# Patient Record
Sex: FEMALE | Race: WHITE | NOT HISPANIC OR LATINO | Employment: FULL TIME | URBAN - METROPOLITAN AREA
[De-identification: names, ages, dates, MRNs, and addresses within clinical notes are randomized per-mention and may not be internally consistent; named-entity substitution may affect disease eponyms.]

---

## 2021-07-14 ENCOUNTER — OFFICE VISIT (OUTPATIENT)
Dept: OTOLARYNGOLOGY | Facility: CLINIC | Age: 41
End: 2021-07-14

## 2021-07-14 VITALS — HEIGHT: 66 IN | WEIGHT: 246.4 LBS | TEMPERATURE: 97.8 F | BODY MASS INDEX: 39.6 KG/M2

## 2021-07-14 DIAGNOSIS — E04.9 ENLARGED THYROID GLAND: Primary | ICD-10-CM

## 2021-07-14 DIAGNOSIS — E06.9 THYROIDITIS: ICD-10-CM

## 2021-07-14 PROCEDURE — 99203 OFFICE O/P NEW LOW 30 MIN: CPT | Performed by: NURSE PRACTITIONER

## 2021-07-14 RX ORDER — IBUPROFEN 800 MG/1
800 TABLET ORAL 3 TIMES DAILY PRN
COMMUNITY
Start: 2021-04-27 | End: 2022-03-30

## 2021-07-14 RX ORDER — LEVOTHYROXINE SODIUM 88 UG/1
88 TABLET ORAL DAILY
COMMUNITY
Start: 2021-06-23 | End: 2022-03-29 | Stop reason: HOSPADM

## 2021-07-14 RX ORDER — METHOCARBAMOL 500 MG/1
TABLET, FILM COATED ORAL
COMMUNITY
Start: 2021-05-03 | End: 2022-04-04

## 2021-07-14 NOTE — PROGRESS NOTES
Patient Name: Arlen Hernandes   Visit Date: 07/14/2021   Patient ID: [id number]  Provider: JULIETA Mercedes    Sex: female  [place] [facility]   YOB: 1980  Location Address: [address] [place] [address], [address] [place] [address],?[place]?[address]    Primary Care Provider Allie Guzman APRN  Location Phone: [phone]    Referring Provider: Lucas Holley MD        Chief Complaint  Thyroid Problem    Subjective   Arlen Hernandes is a 40 y.o. female who presents to [facility] today as a consult from Lucas Holley MD for evaluation of thyroid enlargement and thyroiditis.  Patient reports for the past several years she has been dealing with hypothyroidism.  She states that over the past year she has had an increase in symptoms including fatigue, weight gain, hair loss.  Labs drawn from 5/4/2021 show her TSH elevated at 22.95.  She states at that time she was increased to 88 mcg of levothyroxine.  Repeat TSH on 6/22/2021 showed that her TSH level was still elevated but had improved to 9.48.  She states she is due to have her labs redrawn in September.  patient states that over the past few months she noticed an enlargement on the right side of her neck.  She states that it would sometimes feel tight when she lays down at night. She had an ultrasound of her thyroid performed on 5/6/2021 that showed the right lobe at 2.7 x 3.2 x 5.3 cm in the left lobe at 1.4 x 1.5 x 4.1 cm.  The isthmus thickness was 8 mm.  Thyroid echogenicity is diffusely decreased and echotexture is diffusely coarsened.  Somewhat lacelike appearance of the thyroid parenchyma bilaterally, nonspecific but could reflect sequela of Hashimoto's thyroiditis.  There is asymmetric enlargement of the right thyroid lobe compared to the left but no cystic or solid thyroid mass on either side.  Patient denies any dysphagia symptoms.      Current Outpatient Medications on File Prior to Visit  "  Medication Sig   • Cyanocobalamin (Vitamin B12) 1000 MCG tablet controlled-release Take 1 tablet by mouth Daily.   • ibuprofen (ADVIL,MOTRIN) 800 MG tablet Take 800 mg by mouth 3 (Three) Times a Day As Needed. for pain   • levothyroxine (SYNTHROID, LEVOTHROID) 88 MCG tablet Take 88 mcg by mouth Daily.   • methocarbamol (ROBAXIN) 500 MG tablet TAKE 1 TABLET BY MOUTH EVERY 12 HOURS AS NEEDED FOR MUSCLE SPASM OR PAIN   • vitamin D3 125 MCG (5000 UT) capsule capsule Take 1 capsule by mouth Daily.     No current facility-administered medications on file prior to visit.        Social History     Tobacco Use   • Smoking status: Current Every Day Smoker     Packs/day: 1.00     Types: Cigarettes   • Smokeless tobacco: Never Used   • Tobacco comment: Smoked 11-20 years    Vaping Use   • Vaping Use: Never used   Substance Use Topics   • Alcohol use: Yes     Comment: Rarely    • Drug use: Not on file       Objective     Vital Signs:   Temp 97.8 °F (36.6 °C) (Temporal)   Ht 167.6 cm (66\")   Wt 112 kg (246 lb 6.4 oz)   BMI 39.77 kg/m²       Physical Exam  Constitutional:       General: She is not in acute distress.     Appearance: Normal appearance. She is not ill-appearing.   HENT:      Head: Normocephalic and atraumatic.      Jaw: There is normal jaw occlusion. No tenderness or pain on movement.      Salivary Glands: Right salivary gland is not diffusely enlarged or tender. Left salivary gland is not diffusely enlarged or tender.      Right Ear: Tympanic membrane, ear canal and external ear normal.      Left Ear: Tympanic membrane, ear canal and external ear normal.      Nose: Nose normal. No septal deviation.      Right Sinus: No maxillary sinus tenderness or frontal sinus tenderness.      Left Sinus: No maxillary sinus tenderness or frontal sinus tenderness.      Mouth/Throat:      Lips: Pink. No lesions.      Mouth: Mucous membranes are moist. No oral lesions.      Dentition: Normal dentition.      Tongue: No lesions. "      Palate: No mass and lesions.      Pharynx: Oropharynx is clear. Uvula midline.      Tonsils: No tonsillar exudate.   Eyes:      Extraocular Movements: Extraocular movements intact.      Conjunctiva/sclera: Conjunctivae normal.      Pupils: Pupils are equal, round, and reactive to light.   Neck:      Thyroid: Thyromegaly present. No thyroid mass or thyroid tenderness.      Trachea: Trachea normal.      Comments: Right-sided thyroid enlargement noted.  No mass palpated.  Pulmonary:      Effort: Pulmonary effort is normal. No respiratory distress.   Musculoskeletal:         General: Normal range of motion.      Cervical back: Normal range of motion and neck supple. No tenderness.   Lymphadenopathy:      Cervical: No cervical adenopathy.   Skin:     General: Skin is warm and dry.   Neurological:      General: No focal deficit present.      Mental Status: She is alert and oriented to person, place, and time.      Cranial Nerves: No cranial nerve deficit.      Motor: No weakness.      Gait: Gait normal.   Psychiatric:         Mood and Affect: Mood normal.         Behavior: Behavior normal.         Thought Content: Thought content normal.         Judgment: Judgment normal.               Result Review :         Thyroid ultrasound from 5/6/2021 reviewed and findings are stated in HPI.     Assessment and Plan    Diagnoses and all orders for this visit:    1. Enlarged thyroid gland (Primary)  -     US Thyroid; Future    2. Thyroiditis  -     US Thyroid; Future    I will order repeat thyroid ultrasound in 6 months and plan to see her back after this.  I encouraged her to continue to take her levothyroxine first thing in the morning on an empty stomach.  She will have her labs rechecked in 3 months and I will get copies of these for review.       Follow Up   Return in about 6 months (around 1/14/2022), or after thyroid ultrasound.  Patient was given instructions and counseling regarding her condition or for health  maintenance advice. Please see specific information pulled into the AVS if appropriate.      JULIETA Mercedes

## 2022-01-03 ENCOUNTER — HOSPITAL ENCOUNTER (OUTPATIENT)
Dept: ULTRASOUND IMAGING | Facility: HOSPITAL | Age: 42
Discharge: HOME | End: 2022-01-03
Admitting: NURSE PRACTITIONER

## 2022-01-03 DIAGNOSIS — E06.9 THYROIDITIS: ICD-10-CM

## 2022-01-03 DIAGNOSIS — E04.9 ENLARGED THYROID GLAND: ICD-10-CM

## 2022-01-03 PROCEDURE — 76536 US EXAM OF HEAD AND NECK: CPT

## 2022-01-14 ENCOUNTER — OFFICE VISIT (OUTPATIENT)
Dept: OTOLARYNGOLOGY | Facility: CLINIC | Age: 42
End: 2022-01-14

## 2022-01-14 VITALS — HEIGHT: 66 IN | BODY MASS INDEX: 39.66 KG/M2 | TEMPERATURE: 97.2 F | WEIGHT: 246.8 LBS

## 2022-01-14 DIAGNOSIS — E03.9 HYPOTHYROIDISM, UNSPECIFIED TYPE: ICD-10-CM

## 2022-01-14 DIAGNOSIS — E04.9 ENLARGED THYROID GLAND: Primary | ICD-10-CM

## 2022-01-14 PROCEDURE — 99213 OFFICE O/P EST LOW 20 MIN: CPT | Performed by: NURSE PRACTITIONER

## 2022-01-14 RX ORDER — PRAVASTATIN SODIUM 40 MG
40 TABLET ORAL
COMMUNITY
Start: 2021-12-21

## 2022-01-14 RX ORDER — LEVOTHYROXINE SODIUM 0.1 MG/1
100 TABLET ORAL
COMMUNITY
Start: 2021-12-21 | End: 2022-05-20

## 2022-01-14 RX ORDER — BROMPHENIRAMINE MALEATE, PSEUDOEPHEDRINE HYDROCHLORIDE, AND DEXTROMETHORPHAN HYDROBROMIDE 2; 30; 10 MG/5ML; MG/5ML; MG/5ML
SYRUP ORAL
COMMUNITY
Start: 2021-12-20 | End: 2022-03-30

## 2022-01-14 NOTE — PROGRESS NOTES
Patient Name: Arlen Hernandes   Visit Date: 01/14/2022   Patient ID: [id number]  Provider: JULIETA Mercedes    Sex: female  Location: [facility]   YOB: 1980  Location Address: [address] [place] [address], [address],?[place]?[address]    Primary Care Provider Allie Guzman APRN  Location Phone: [phone]    Referring Provider: No ref. provider found        Chief Complaint  Results (6 month US RESULTS)    Subjective          Arlen Hernandes is a 41 y.o. female who presents to [facility] for a follow-up visit of right-sided enlarged thyroid and hypothyroidism. When she was last seen 6 months ago she was in the process of medication dose adjustment with her TSH being elevated at 22.95. She states that she recently had her levothyroxine increased to 100 mcg daily and her last labs from 12/20/2021 showed the TSH is normal at 2.72. She states that she does feel like she has some what more energy but still battles fatigue from time to time. She had a repeat thyroid ultrasound performed on 1/3/2022 that was compared to a previous ultrasound from twenty sixteen. This noted an asymmetric enlargement of the right thyroid with a possible dominant mass measuring up to 4 cm. This was previously measured in twenty sixteen and appears similar and also biopsied from 3/21/2016 that showed Hurthle cells. It is not significantly changed with no other new obvious nodules or change. The patient reports today that she would like to discuss having the right side of her thyroid removed. She states that she feels like it is enlarging on the right side and she has a difficult time wearing certain shirts and also a hard time swallowing. She states that she feels like her thyroid places pressure on her neck and she will have pain that radiates from her neck up into the back of her head. She states she has dysphagia symptoms intermittently.      Current Outpatient  "Medications on File Prior to Visit   Medication Sig   • Cyanocobalamin (Vitamin B12) 1000 MCG tablet controlled-release Take 1 tablet by mouth Daily.   • ibuprofen (ADVIL,MOTRIN) 800 MG tablet Take 800 mg by mouth 3 (Three) Times a Day As Needed. for pain   • levothyroxine (SYNTHROID, LEVOTHROID) 100 MCG tablet Take 100 mcg by mouth Daily.   • pravastatin (PRAVACHOL) 40 MG tablet Take 40 mg by mouth every night at bedtime.   • vitamin D3 125 MCG (5000 UT) capsule capsule Take 1 capsule by mouth Daily.   • brompheniramine-pseudoephedrine-DM 30-2-10 MG/5ML syrup TAKE 10 ML BY MOUTH EVERY 4 HOURS FOR 3 DAYS AS NEEDED   • levothyroxine (SYNTHROID, LEVOTHROID) 88 MCG tablet Take 88 mcg by mouth Daily.   • methocarbamol (ROBAXIN) 500 MG tablet TAKE 1 TABLET BY MOUTH EVERY 12 HOURS AS NEEDED FOR MUSCLE SPASM OR PAIN     No current facility-administered medications on file prior to visit.        Social History     Tobacco Use   • Smoking status: Current Every Day Smoker     Packs/day: 0.50     Types: Cigarettes   • Smokeless tobacco: Never Used   • Tobacco comment: Smoked 11-20 years    Vaping Use   • Vaping Use: Never used   Substance Use Topics   • Alcohol use: Yes     Comment: Rarely    • Drug use: Not on file        Objective     Vital Signs:   Temp 97.2 °F (36.2 °C) (Temporal)   Ht 167.6 cm (66\")   Wt 112 kg (246 lb 12.8 oz)   BMI 39.83 kg/m²       Physical Exam  Constitutional:       General: She is not in acute distress.     Appearance: Normal appearance. She is not ill-appearing.   HENT:      Head: Normocephalic and atraumatic.      Jaw: There is normal jaw occlusion. No tenderness or pain on movement.      Salivary Glands: Right salivary gland is not diffusely enlarged or tender. Left salivary gland is not diffusely enlarged or tender.      Right Ear: Tympanic membrane, ear canal and external ear normal.      Left Ear: Tympanic membrane, ear canal and external ear normal.      Nose: Nose normal. No septal " deviation.      Right Sinus: No maxillary sinus tenderness or frontal sinus tenderness.      Left Sinus: No maxillary sinus tenderness or frontal sinus tenderness.      Mouth/Throat:      Lips: Pink. No lesions.      Mouth: Mucous membranes are moist. No oral lesions.      Dentition: Normal dentition.      Tongue: No lesions.      Palate: No mass and lesions.      Pharynx: Oropharynx is clear. Uvula midline.      Tonsils: No tonsillar exudate.   Eyes:      Extraocular Movements: Extraocular movements intact.      Conjunctiva/sclera: Conjunctivae normal.      Pupils: Pupils are equal, round, and reactive to light.   Neck:      Thyroid: Thyromegaly present. No thyroid mass or thyroid tenderness.      Trachea: Trachea normal.      Comments: Right-sided thyroid enlargement with no obvious nodule or mass  Pulmonary:      Effort: Pulmonary effort is normal. No respiratory distress.   Musculoskeletal:         General: Normal range of motion.      Cervical back: Normal range of motion and neck supple. No tenderness.   Lymphadenopathy:      Cervical: No cervical adenopathy.   Skin:     General: Skin is warm and dry.   Neurological:      General: No focal deficit present.      Mental Status: She is alert and oriented to person, place, and time.      Cranial Nerves: No cranial nerve deficit.      Motor: No weakness.      Gait: Gait normal.   Psychiatric:         Mood and Affect: Mood normal.         Behavior: Behavior normal.         Thought Content: Thought content normal.         Judgment: Judgment normal.                Result Review :          US Thyroid (01/03/2022 13:11)  See HPI     Assessment and Plan    Diagnoses and all orders for this visit:    1. Enlarged thyroid gland (Primary)    2. Hypothyroidism, unspecified type    Patient has expressed interest in consultation about surgical removal of her right thyroid lobe. I have discussed this with the patient with the results of her ultrasound and lab work and she has  expressed a desire to meet with a surgeon to discuss this further. I will get her set up with Dr. Mccord for surgical consultation.       Follow Up   Return Schedule with Dr. Mccord for surgical consult.  Patient was given instructions and counseling regarding her condition or for health maintenance advice. Please see specific information pulled into the AVS if appropriate.     JULIETA Mercedes

## 2022-02-16 ENCOUNTER — OFFICE VISIT (OUTPATIENT)
Dept: OTOLARYNGOLOGY | Facility: CLINIC | Age: 42
End: 2022-02-16

## 2022-02-16 ENCOUNTER — PREP FOR SURGERY (OUTPATIENT)
Dept: OTHER | Facility: HOSPITAL | Age: 42
End: 2022-02-16

## 2022-02-16 VITALS — HEIGHT: 66 IN | TEMPERATURE: 97.8 F | WEIGHT: 240.8 LBS | BODY MASS INDEX: 38.7 KG/M2

## 2022-02-16 DIAGNOSIS — E04.9 ENLARGED THYROID GLAND: Primary | ICD-10-CM

## 2022-02-16 DIAGNOSIS — E06.3 HYPOTHYROIDISM DUE TO HASHIMOTO'S THYROIDITIS: ICD-10-CM

## 2022-02-16 DIAGNOSIS — E04.1 THYROID NODULE: ICD-10-CM

## 2022-02-16 DIAGNOSIS — E03.8 HYPOTHYROIDISM DUE TO HASHIMOTO'S THYROIDITIS: ICD-10-CM

## 2022-02-16 PROCEDURE — 99214 OFFICE O/P EST MOD 30 MIN: CPT | Performed by: OTOLARYNGOLOGY

## 2022-02-16 NOTE — PROGRESS NOTES
Patient Name: Arlen Hernandes   Visit Date: 02/16/2022   Patient ID: [id number]  Provider: Indra Mccord MD    Sex: female  [place] [facility]   YOB: 1980  Location Address: [address] [place] [address], [address] [place] [address],?[place]?[address]    Primary Care Provider Allie Guzman APRN  Location Phone: [phone]    Referring Provider: No ref. provider found        Chief Complaint  Discuss possible surgery    History of Present Illness  Arlen Hernandes is a 41 y.o. female who is seen for follow-up of hypothyroidism and new enlarged right thyroid lobe with possible nodule.  She has been seen by JULIETA Majano on 7/14/2021 and 1/14/2022.  Please see those notes for further details.  Thyroid ultrasound on 1/3/2022 revealed a possible 4 cm right thyroid nodule which previously measured 4.5 cm on 3/21/2016.  An FNA at the time revealed an atypical lymphoid population with occasional Hurthle cells likely consistent with Hashimoto's thyroiditis.  She tells me that she has been taking levothyroxine over the past 6 years and has steadily had to increase her dose.  She is unsure of any recent thyroid function testing.  She tells me that she often feels discomfort in the area of her right thyroid lobe.  She also mentions significant fatigue, dry skin, hair loss, headache, and inability to lose weight.  She denies any family history of thyroid cancer or personal history of radiation exposure.      Past Medical History:   Diagnosis Date   • Thyroid disorder        Past Surgical History:   Procedure Laterality Date   • HYSTERECTOMY  2018   • LYMPHADENECTOMY  2012         Current Outpatient Medications:   •  Cyanocobalamin (Vitamin B12) 1000 MCG tablet controlled-release, Take 1 tablet by mouth Daily., Disp: , Rfl:   •  ibuprofen (ADVIL,MOTRIN) 800 MG tablet, Take 800 mg by mouth 3 (Three) Times a Day As Needed. for pain, Disp: , Rfl:   •  levothyroxine (SYNTHROID, LEVOTHROID) 100 MCG tablet,  "Take 100 mcg by mouth Daily., Disp: , Rfl:   •  pravastatin (PRAVACHOL) 40 MG tablet, Take 40 mg by mouth every night at bedtime., Disp: , Rfl:   •  vitamin D3 125 MCG (5000 UT) capsule capsule, Take 1 capsule by mouth Daily., Disp: , Rfl:   •  brompheniramine-pseudoephedrine-DM 30-2-10 MG/5ML syrup, TAKE 10 ML BY MOUTH EVERY 4 HOURS FOR 3 DAYS AS NEEDED, Disp: , Rfl:   •  levothyroxine (SYNTHROID, LEVOTHROID) 88 MCG tablet, Take 88 mcg by mouth Daily., Disp: , Rfl:   •  methocarbamol (ROBAXIN) 500 MG tablet, TAKE 1 TABLET BY MOUTH EVERY 12 HOURS AS NEEDED FOR MUSCLE SPASM OR PAIN, Disp: , Rfl:      No Known Allergies    Social History     Tobacco Use   • Smoking status: Current Every Day Smoker     Packs/day: 0.50     Types: Cigarettes   • Smokeless tobacco: Never Used   • Tobacco comment: Smoked 11-20 years    Vaping Use   • Vaping Use: Never used   Substance Use Topics   • Alcohol use: Yes     Comment: Rarely    • Drug use: Not on file        Objective     Vital Signs:   Temp 97.8 °F (36.6 °C) (Temporal)   Ht 167.6 cm (66\")   Wt 109 kg (240 lb 12.8 oz)   BMI 38.87 kg/m²       Physical Exam    General: Well developed, well nourished patient of stated age in no acute distress. Voice is strong and clear.   Head: Normocephalic and atraumatic.  Face: No lesions.  Bilateral parotid and submandibular glands are unremarkable.  Stensen's and Warthin's ducts are productive of clear saliva bilaterally.  House-Brackmann I/VI     bilaterally.   muscles and temporomandibular joint nontender to palpation.  No TMJ crepitus.  Eyes: PERRLA, sclerae anicteric, no conjunctival injection. Extra ocular movements are intact and full. No nystagmus.   Ears: Auricles are normal in appearance. Bilateral external auditory canals are unremarkable. Bilateral tympanic membranes are clear and without effusion. Hearing normal to conversational voice.   Nose: External nose is normal in appearance. Bilateral nares are patent with " normal appearing mucosa. Septum midline. Turbinates are unremarkable. No lesions.   Oral Cavity: Lips are normal in appearance. Oral mucosa is unremarkable. Gingiva is unremarkable. Normal dentition for age. Tongue is unremarkable with good movement. Hard palate is unremarkable.   Oropharynx: Soft palate is unremarkable with full movement. Uvula is unremarkable. Bilateral tonsils are unremarkable. Posterior oropharynx is unremarkable.    Larynx and hypopharynx: Deferred secondary to gag reflex.  Neck: Supple.  No mass.  Nontender to palpation.  Trachea midline.  Right thyroid lobe is enlarged but soft to palpation.  The overlying strap musculature moves freely.  She does report some tenderness with palpation.   Lymphatic: No lymphadenopathy upon palpation.  Respiratory: Clear to auscultation bilaterally, nonlabored respirations    Cardiovascular: RRR, no murmurs, rubs, or gallops,   Psychiatric: Appropriate affect, cooperative   Neurologic: Oriented x 3, strength symmetric in all extremities, Cranial Nerves II-XII are grossly intact to confrontation   Skin: Warm and dry. No rashes.    Procedures           Result Review :               Assessment and Plan    Diagnoses and all orders for this visit:    1. Enlarged thyroid gland (Primary)    2. Hypothyroidism due to Hashimoto's thyroiditis    3. Thyroid nodule    Impressions and findings were discussed at great length.  We reviewed and discussed the images from her 1/3/2022 thyroid ultrasound which revealed a 4 cm right thyroid mass which previously measured 4.5 cm which has undergone an FNA in the past which revealed an atypical lymphoid population with occasional Hurthle cells likely consistent with Hashimoto's thyroiditis.  She is bothered by a pressure sensation/discomfort in the area of her right thyroid lobe.  We discussed that this may be secondary to her 4 cm right thyroid nodule but cannot be certain.  Options for further evaluation management were discussed  including continued observation versus ultrasound-guided FNA versus right thyroid lobectomy and isthmusectomy.  We discussed that thyroid lobectomy and isthmusectomy would not improve her fatigue, dry skin, hair loss, headache, or weight stability.  We discussed the risks, benefits, and alternatives.  The risks, benefits, and alternatives were discussed. The risks include bleeding, infection, scarring, hematoma, damage to the recurrent laryngeal nerve causing hoarseness or aspiration, damage to the parathyroid glands causing hypocalcemia, and the possible need for further procedures.  After a thorough discussion she elected to pursue right thyroid lobectomy and isthmusectomy.  This will be scheduled at her earliest convenience.          Follow Up   Return if symptoms worsen or fail to improve.  Patient was given instructions and counseling regarding her condition or for health maintenance advice. Please see specific information pulled into the AVS if appropriate.

## 2022-03-29 ENCOUNTER — HOSPITAL ENCOUNTER (OUTPATIENT)
Facility: HOSPITAL | Age: 42
Setting detail: SURGERY ADMIT
Discharge: HOME | End: 2022-03-29
Attending: OTOLARYNGOLOGY | Admitting: OTOLARYNGOLOGY

## 2022-03-29 ENCOUNTER — ANESTHESIA (OUTPATIENT)
Dept: PERIOP | Facility: HOSPITAL | Age: 42
End: 2022-03-29

## 2022-03-29 ENCOUNTER — ANESTHESIA EVENT (OUTPATIENT)
Dept: PERIOP | Facility: HOSPITAL | Age: 42
End: 2022-03-29

## 2022-03-29 VITALS
HEIGHT: 68 IN | OXYGEN SATURATION: 96 % | BODY MASS INDEX: 36.59 KG/M2 | WEIGHT: 241.4 LBS | SYSTOLIC BLOOD PRESSURE: 138 MMHG | RESPIRATION RATE: 14 BRPM | DIASTOLIC BLOOD PRESSURE: 78 MMHG | HEART RATE: 100 BPM | TEMPERATURE: 98.2 F

## 2022-03-29 DIAGNOSIS — E04.9 ENLARGED THYROID GLAND: ICD-10-CM

## 2022-03-29 PROCEDURE — 25010000002 MIDAZOLAM PER 1 MG: Performed by: ANESTHESIOLOGY

## 2022-03-29 PROCEDURE — 25010000002 KETOROLAC TROMETHAMINE PER 15 MG: Performed by: NURSE ANESTHETIST, CERTIFIED REGISTERED

## 2022-03-29 PROCEDURE — 25010000002 PROPOFOL 10 MG/ML EMULSION: Performed by: NURSE ANESTHETIST, CERTIFIED REGISTERED

## 2022-03-29 PROCEDURE — 25010000002 FENTANYL CITRATE (PF) 50 MCG/ML SOLUTION: Performed by: NURSE ANESTHETIST, CERTIFIED REGISTERED

## 2022-03-29 PROCEDURE — 25010000002 ONDANSETRON PER 1 MG: Performed by: NURSE ANESTHETIST, CERTIFIED REGISTERED

## 2022-03-29 PROCEDURE — 88307 TISSUE EXAM BY PATHOLOGIST: CPT | Performed by: OTOLARYNGOLOGY

## 2022-03-29 PROCEDURE — 60220 PARTIAL REMOVAL OF THYROID: CPT | Performed by: OTOLARYNGOLOGY

## 2022-03-29 PROCEDURE — 25010000002 HYDROMORPHONE PER 4 MG: Performed by: NURSE ANESTHETIST, CERTIFIED REGISTERED

## 2022-03-29 PROCEDURE — 25010000002 DEXAMETHASONE PER 1 MG: Performed by: NURSE ANESTHETIST, CERTIFIED REGISTERED

## 2022-03-29 PROCEDURE — 25010000002 CEFAZOLIN PER 500 MG: Performed by: NURSE ANESTHETIST, CERTIFIED REGISTERED

## 2022-03-29 DEVICE — ARISTA AH ABSORBABLE HEMOSTATIC PARTICLES
Type: IMPLANTABLE DEVICE | Site: NECK | Status: FUNCTIONAL
Brand: ARISTA™ AH

## 2022-03-29 RX ORDER — LIDOCAINE HYDROCHLORIDE AND EPINEPHRINE 10; 10 MG/ML; UG/ML
INJECTION, SOLUTION INFILTRATION; PERINEURAL AS NEEDED
Status: DISCONTINUED | OUTPATIENT
Start: 2022-03-29 | End: 2022-03-29 | Stop reason: HOSPADM

## 2022-03-29 RX ORDER — ONDANSETRON 2 MG/ML
4 INJECTION INTRAMUSCULAR; INTRAVENOUS ONCE AS NEEDED
Status: DISCONTINUED | OUTPATIENT
Start: 2022-03-29 | End: 2022-03-29 | Stop reason: HOSPADM

## 2022-03-29 RX ORDER — PROMETHAZINE HYDROCHLORIDE 12.5 MG/1
25 TABLET ORAL ONCE AS NEEDED
Status: DISCONTINUED | OUTPATIENT
Start: 2022-03-29 | End: 2022-03-29 | Stop reason: HOSPADM

## 2022-03-29 RX ORDER — HYDROMORPHONE HCL 110MG/55ML
PATIENT CONTROLLED ANALGESIA SYRINGE INTRAVENOUS AS NEEDED
Status: DISCONTINUED | OUTPATIENT
Start: 2022-03-29 | End: 2022-03-29 | Stop reason: SURG

## 2022-03-29 RX ORDER — DEXAMETHASONE SODIUM PHOSPHATE 4 MG/ML
INJECTION, SOLUTION INTRA-ARTICULAR; INTRALESIONAL; INTRAMUSCULAR; INTRAVENOUS; SOFT TISSUE AS NEEDED
Status: DISCONTINUED | OUTPATIENT
Start: 2022-03-29 | End: 2022-03-29 | Stop reason: SURG

## 2022-03-29 RX ORDER — ACETAMINOPHEN 500 MG
1000 TABLET ORAL ONCE
Status: COMPLETED | OUTPATIENT
Start: 2022-03-29 | End: 2022-03-29

## 2022-03-29 RX ORDER — MIDAZOLAM HYDROCHLORIDE 1 MG/ML
2 INJECTION INTRAMUSCULAR; INTRAVENOUS ONCE
Status: COMPLETED | OUTPATIENT
Start: 2022-03-29 | End: 2022-03-29

## 2022-03-29 RX ORDER — ONDANSETRON 2 MG/ML
INJECTION INTRAMUSCULAR; INTRAVENOUS AS NEEDED
Status: DISCONTINUED | OUTPATIENT
Start: 2022-03-29 | End: 2022-03-29 | Stop reason: SURG

## 2022-03-29 RX ORDER — GINSENG 100 MG
CAPSULE ORAL AS NEEDED
Status: DISCONTINUED | OUTPATIENT
Start: 2022-03-29 | End: 2022-03-29 | Stop reason: HOSPADM

## 2022-03-29 RX ORDER — LIDOCAINE HYDROCHLORIDE 20 MG/ML
INJECTION, SOLUTION INFILTRATION; PERINEURAL AS NEEDED
Status: DISCONTINUED | OUTPATIENT
Start: 2022-03-29 | End: 2022-03-29 | Stop reason: SURG

## 2022-03-29 RX ORDER — SODIUM CHLORIDE, SODIUM LACTATE, POTASSIUM CHLORIDE, CALCIUM CHLORIDE 600; 310; 30; 20 MG/100ML; MG/100ML; MG/100ML; MG/100ML
9 INJECTION, SOLUTION INTRAVENOUS CONTINUOUS PRN
Status: DISCONTINUED | OUTPATIENT
Start: 2022-03-29 | End: 2022-03-29 | Stop reason: HOSPADM

## 2022-03-29 RX ORDER — OXYCODONE HYDROCHLORIDE 5 MG/1
5 TABLET ORAL
Status: DISCONTINUED | OUTPATIENT
Start: 2022-03-29 | End: 2022-03-29 | Stop reason: HOSPADM

## 2022-03-29 RX ORDER — CEFAZOLIN SODIUM 1 G/3ML
INJECTION, POWDER, FOR SOLUTION INTRAMUSCULAR; INTRAVENOUS AS NEEDED
Status: DISCONTINUED | OUTPATIENT
Start: 2022-03-29 | End: 2022-03-29 | Stop reason: SURG

## 2022-03-29 RX ORDER — MAGNESIUM HYDROXIDE 1200 MG/15ML
LIQUID ORAL AS NEEDED
Status: DISCONTINUED | OUTPATIENT
Start: 2022-03-29 | End: 2022-03-29 | Stop reason: HOSPADM

## 2022-03-29 RX ORDER — ROCURONIUM BROMIDE 10 MG/ML
INJECTION, SOLUTION INTRAVENOUS AS NEEDED
Status: DISCONTINUED | OUTPATIENT
Start: 2022-03-29 | End: 2022-03-29 | Stop reason: SURG

## 2022-03-29 RX ORDER — MEPERIDINE HYDROCHLORIDE 25 MG/ML
12.5 INJECTION INTRAMUSCULAR; INTRAVENOUS; SUBCUTANEOUS
Status: DISCONTINUED | OUTPATIENT
Start: 2022-03-29 | End: 2022-03-29 | Stop reason: HOSPADM

## 2022-03-29 RX ORDER — PROPOFOL 10 MG/ML
VIAL (ML) INTRAVENOUS AS NEEDED
Status: DISCONTINUED | OUTPATIENT
Start: 2022-03-29 | End: 2022-03-29 | Stop reason: SURG

## 2022-03-29 RX ORDER — PROMETHAZINE HYDROCHLORIDE 25 MG/1
25 SUPPOSITORY RECTAL ONCE AS NEEDED
Status: DISCONTINUED | OUTPATIENT
Start: 2022-03-29 | End: 2022-03-29 | Stop reason: HOSPADM

## 2022-03-29 RX ORDER — GLYCOPYRROLATE 0.2 MG/ML
0.2 INJECTION INTRAMUSCULAR; INTRAVENOUS
Status: COMPLETED | OUTPATIENT
Start: 2022-03-29 | End: 2022-03-29

## 2022-03-29 RX ORDER — HYDROCODONE BITARTRATE AND ACETAMINOPHEN 7.5; 325 MG/1; MG/1
1 TABLET ORAL EVERY 4 HOURS PRN
Qty: 20 TABLET | Refills: 0 | Status: SHIPPED | OUTPATIENT
Start: 2022-03-29

## 2022-03-29 RX ORDER — KETOROLAC TROMETHAMINE 30 MG/ML
INJECTION, SOLUTION INTRAMUSCULAR; INTRAVENOUS AS NEEDED
Status: DISCONTINUED | OUTPATIENT
Start: 2022-03-29 | End: 2022-03-29 | Stop reason: SURG

## 2022-03-29 RX ORDER — FENTANYL CITRATE 50 UG/ML
INJECTION, SOLUTION INTRAMUSCULAR; INTRAVENOUS AS NEEDED
Status: DISCONTINUED | OUTPATIENT
Start: 2022-03-29 | End: 2022-03-29 | Stop reason: SURG

## 2022-03-29 RX ORDER — GINSENG 100 MG
1 CAPSULE ORAL 2 TIMES DAILY
Qty: 14 G | Refills: 1 | Status: SHIPPED | OUTPATIENT
Start: 2022-03-29 | End: 2022-04-08

## 2022-03-29 RX ADMIN — GLYCOPYRROLATE 0.2 MG: 0.2 INJECTION INTRAMUSCULAR; INTRAVENOUS at 08:59

## 2022-03-29 RX ADMIN — ROCURONIUM BROMIDE 25 MG: 10 INJECTION INTRAVENOUS at 09:10

## 2022-03-29 RX ADMIN — FENTANYL CITRATE 100 MCG: 50 INJECTION, SOLUTION INTRAMUSCULAR; INTRAVENOUS at 09:34

## 2022-03-29 RX ADMIN — CEFAZOLIN SODIUM 2 G: 1 INJECTION, POWDER, FOR SOLUTION INTRAMUSCULAR; INTRAVENOUS at 09:23

## 2022-03-29 RX ADMIN — ONDANSETRON 4 MG: 2 INJECTION INTRAMUSCULAR; INTRAVENOUS at 11:08

## 2022-03-29 RX ADMIN — HYDROMORPHONE HYDROCHLORIDE 0.5 MG: 2 INJECTION, SOLUTION INTRAMUSCULAR; INTRAVENOUS; SUBCUTANEOUS at 10:14

## 2022-03-29 RX ADMIN — HYDROMORPHONE HYDROCHLORIDE 0.5 MG: 2 INJECTION, SOLUTION INTRAMUSCULAR; INTRAVENOUS; SUBCUTANEOUS at 10:36

## 2022-03-29 RX ADMIN — LIDOCAINE HYDROCHLORIDE 100 MG: 20 INJECTION, SOLUTION INFILTRATION; PERINEURAL at 09:10

## 2022-03-29 RX ADMIN — HYDROMORPHONE HYDROCHLORIDE 0.5 MG: 2 INJECTION, SOLUTION INTRAMUSCULAR; INTRAVENOUS; SUBCUTANEOUS at 09:58

## 2022-03-29 RX ADMIN — FENTANYL CITRATE 100 MCG: 50 INJECTION, SOLUTION INTRAMUSCULAR; INTRAVENOUS at 09:10

## 2022-03-29 RX ADMIN — ACETAMINOPHEN 1000 MG: 500 TABLET ORAL at 08:30

## 2022-03-29 RX ADMIN — MIDAZOLAM HYDROCHLORIDE 2 MG: 1 INJECTION, SOLUTION INTRAMUSCULAR; INTRAVENOUS at 08:59

## 2022-03-29 RX ADMIN — PROPOFOL 200 MG: 10 INJECTION, EMULSION INTRAVENOUS at 09:10

## 2022-03-29 RX ADMIN — KETOROLAC TROMETHAMINE 30 MG: 30 INJECTION, SOLUTION INTRAMUSCULAR; INTRAVENOUS at 11:14

## 2022-03-29 RX ADMIN — ROCURONIUM BROMIDE 10 MG: 10 INJECTION INTRAVENOUS at 09:35

## 2022-03-29 RX ADMIN — SODIUM CHLORIDE, POTASSIUM CHLORIDE, SODIUM LACTATE AND CALCIUM CHLORIDE 9 ML/HR: 600; 310; 30; 20 INJECTION, SOLUTION INTRAVENOUS at 08:30

## 2022-03-29 RX ADMIN — SODIUM CHLORIDE, POTASSIUM CHLORIDE, SODIUM LACTATE AND CALCIUM CHLORIDE: 600; 310; 30; 20 INJECTION, SOLUTION INTRAVENOUS at 10:42

## 2022-03-29 RX ADMIN — DEXAMETHASONE SODIUM PHOSPHATE 4 MG: 4 INJECTION INTRA-ARTICULAR; INTRALESIONAL; INTRAMUSCULAR; INTRAVENOUS; SOFT TISSUE at 09:24

## 2022-03-29 NOTE — ANESTHESIA POSTPROCEDURE EVALUATION
Patient: Arlen Hernandes    Procedure Summary     Date: 03/29/22 Room / Location: [facility]  / [facility]    Anesthesia Start: 0906 Anesthesia Stop: 1127    Procedure: RIGHT THYROID LOBECTOMY, ISTHMUSECTOMY (Right Neck) Diagnosis:       Enlarged thyroid gland      (Enlarged thyroid gland [E04.9])    Surgeons: Indra Mccord MD Provider: Marito Bang MD    Anesthesia Type: general ASA Status: 2          Anesthesia Type: general    Vitals  Vitals Value Taken Time   /100 03/29/22 1146   Temp 36 °C (96.8 °F) 03/29/22 1126   Pulse 101 03/29/22 1146   Resp 14 03/29/22 1126   SpO2 91 % 03/29/22 1146   Vitals shown include unvalidated device data.        Post Anesthesia Care and Evaluation    Patient location during evaluation: bedside  Patient participation: complete - patient participated  Level of consciousness: awake  Pain score: 0  Pain management: adequate  Airway patency: patent  Anesthetic complications: No anesthetic complications  PONV Status: none  Cardiovascular status: acceptable and stable  Respiratory status: acceptable and room air  Hydration status: acceptable    Comments: An Anesthesiologist personally participated in the most demanding procedures (including induction and emergence if applicable) in the anesthesia plan, monitored the course of anesthesia administration at frequent intervals and remained physically present and available for immediate diagnosis and treatment of emergencies.

## 2022-03-29 NOTE — ANESTHESIA PREPROCEDURE EVALUATION
Anesthesia Evaluation     Patient summary reviewed and Nursing notes reviewed   NPO Solid Status: > 6 hours  NPO Liquid Status: > 4 hours           Airway   Mallampati: II  TM distance: >3 FB  Dental      Pulmonary - negative pulmonary ROS and normal exam   Cardiovascular - normal exam  Exercise tolerance: excellent (>7 METS)    (+) hyperlipidemia,       Neuro/Psych  GI/Hepatic/Renal/Endo    (+) obesity,   thyroid problem     Musculoskeletal (-) negative ROS    Abdominal  - normal exam   Substance History      OB/GYN          Other                        Anesthesia Plan    ASA 2     general     intravenous induction     Anesthetic plan, all risks, benefits, and alternatives have been provided, discussed and informed consent has been obtained with: patient.        CODE STATUS:       
,

## 2022-03-31 LAB
CYTO UR: NORMAL
LAB AP CASE REPORT: NORMAL
LAB AP CLINICAL INFORMATION: NORMAL
PATH REPORT.FINAL DX SPEC: NORMAL
PATH REPORT.GROSS SPEC: NORMAL

## 2022-04-04 ENCOUNTER — OFFICE VISIT (OUTPATIENT)
Dept: OTOLARYNGOLOGY | Facility: CLINIC | Age: 42
End: 2022-04-04

## 2022-04-04 VITALS — WEIGHT: 242 LBS | BODY MASS INDEX: 36.68 KG/M2 | HEIGHT: 68 IN | TEMPERATURE: 98 F

## 2022-04-04 DIAGNOSIS — E03.8 HYPOTHYROIDISM DUE TO HASHIMOTO'S THYROIDITIS: Primary | ICD-10-CM

## 2022-04-04 DIAGNOSIS — E06.3 HYPOTHYROIDISM DUE TO HASHIMOTO'S THYROIDITIS: Primary | ICD-10-CM

## 2022-04-04 PROCEDURE — 99024 POSTOP FOLLOW-UP VISIT: CPT | Performed by: OTOLARYNGOLOGY

## 2022-04-04 RX ORDER — BACITRACIN ZINC 500 [USP'U]/G
OINTMENT TOPICAL
COMMUNITY
Start: 2022-03-29 | End: 2022-05-19

## 2022-04-04 NOTE — PROGRESS NOTES
Patient Name: Arlen Hernandes   Visit Date: 04/04/2022   Patient ID: [id number]  Provider: Indra Mccord MD    Sex: female  Location: [facility] [place] [facility]   YOB: 1980  Location Address: [address] [place] [address], [address] [place] [address],?[place]?[id number]    Primary Care Provider Allie Guzman APRN  Location Phone: [phone]    Referring Provider: No ref. provider found        Chief Complaint  No chief complaint on file.    History of Present Illness  Arlen Hernandes is a 41 y.o. female who is seen for follow-up of hypothyroidism and new enlarged right thyroid lobe with possible nodule status post right thyroid lobectomy and isthmusectomy on 3/29/2022.  She has been seen by JULIETA Mercedes on 7/14/2021 and 1/14/2022.  Please see those notes for further details.  Thyroid ultrasound on 1/3/2022 revealed a possible 4 cm right thyroid nodule which previously measured 4.5 cm on 3/21/2016.  An FNA at the time revealed an atypical lymphoid population with occasional Hurthle cells likely consistent with Hashimoto's thyroiditis.  She reported discomfort in the area of her right thyroid lobe.    She returns today for follow-up status post right thyroid lobectomy and isthmusectomy on 3/29/2022.  Final pathology was consistent with Hashimoto's thyroiditis.  There was no evidence of cancer or parathyroid tissue.  She tells me that the right-sided neck pressure has resolved.  She is sore and has been limiting the range of motion of her neck.  She is not having any issues with hoarseness.  She does report some dysphagia.    Past Medical History:   Diagnosis Date   • Elevated cholesterol    • Headache 05-   • Hyperlipidemia    • Thyroid disorder        Past Surgical History:   Procedure Laterality Date   • HYSTERECTOMY  2018   • LYMPHADENECTOMY Right 2012    NECK   • THYROIDECTOMY Right 3/29/2022    Procedure: RIGHT THYROID LOBECTOMY, ISTHMUSECTOMY;  Surgeon: Indra Mccord MD;  Location:   [facility];  Service: ENT;  Laterality: Right;         Current Outpatient Medications:   •  bacitracin 500 UNIT/GM ointment, Apply 1 application topically to the appropriate area as directed 2 (Two) Times a Day for 10 days., Disp: 14 g, Rfl: 1  •  bacitracin 500 UNIT/GM ointment, APPLY TOPICALLY TO THE AFFECTED AREA TWICE DAILY FOR 10 DAYS AS DIRECTED, Disp: , Rfl:   •  Cyanocobalamin (Vitamin B12) 1000 MCG tablet controlled-release, Take 1 tablet by mouth Daily. INST PER ANESTHESIA PROTOCOL, Disp: , Rfl:   •  HYDROcodone-acetaminophen (Norco) 7.5-325 MG per tablet, Take 1 tablet by mouth Every 4 (Four) Hours As Needed for Moderate Pain ., Disp: 20 tablet, Rfl: 0  •  levothyroxine (SYNTHROID, LEVOTHROID) 100 MCG tablet, Take 100 mcg by mouth Every Morning., Disp: , Rfl:   •  pravastatin (PRAVACHOL) 40 MG tablet, Take 40 mg by mouth every night at bedtime., Disp: , Rfl:   •  vitamin D3 125 MCG (5000 UT) capsule capsule, Take 1 capsule by mouth Daily. INST PER ANESTHESIA PROTOCOL, Disp: , Rfl:      No Known Allergies    Social History     Tobacco Use   • Smoking status: Current Some Day Smoker     Packs/day: 0.50     Years: 15.00     Pack years: 7.50     Types: Cigarettes   • Smokeless tobacco: Never Used   • Tobacco comment: INST PER ANESTHESIA PROTOCOL   Vaping Use   • Vaping Use: Never used   Substance Use Topics   • Alcohol use: Not Currently     Comment: Rarely    • Drug use: Never        Objective     Vital Signs:   There were no vitals taken for this visit.      Physical Exam    General: Well developed, well nourished patient of stated age in no acute distress. Voice is strong and clear.   Head: Normocephalic and atraumatic.  Face: No lesions.  Bilateral parotid and submandibular glands are unremarkable.  Stensen's and Warthin's ducts are productive of clear saliva bilaterally.  House-Brackmann I/VI     bilaterally.   muscles and temporomandibular joint nontender to palpation.  No TMJ  crepitus.  Eyes: PERRLA, sclerae anicteric, no conjunctival injection. Extra ocular movements are intact and full. No nystagmus.   Ears: Auricles are normal in appearance. Bilateral external auditory canals are unremarkable. Bilateral tympanic membranes are clear and without effusion. Hearing normal to conversational voice.   Nose: External nose is normal in appearance. Bilateral nares are patent with normal appearing mucosa. Septum midline. Turbinates are unremarkable. No lesions.   Oral Cavity: Lips are normal in appearance. Oral mucosa is unremarkable. Gingiva is unremarkable. Normal dentition for age. Tongue is unremarkable with good movement. Hard palate is unremarkable.   Oropharynx: Soft palate is unremarkable with full movement. Uvula is unremarkable. Bilateral tonsils are unremarkable. Posterior oropharynx is unremarkable.    Larynx and hypopharynx: Deferred secondary to gag reflex.   Neck: Supple.  No mass.  Nontender to palpation.  Trachea midline.  Thyroid lobectomy incision is clean, dry, and intact with sutures in place.  Sutures were removed today.  No evidence of hematoma upon palpation   Lymphatic: No lymphadenopathy upon palpation.   Psychiatric: Appropriate affect, cooperative   Neurologic: Oriented x 3, strength symmetric in all extremities, Cranial Nerves II-XII are grossly intact to confrontation   Skin: Warm and dry. No rashes.    Procedures           Result Review :               Assessment and Plan    There are no diagnoses linked to this encounter.Impressions and findings were discussed at great length.  Currently, she is healing up well and we reviewed and discussed her final pathology.  We discussed continued postoperative care.  She will follow up in 6 weeks with preappointment thyroid function testing.        Follow Up   No follow-ups on file.  Patient was given instructions and counseling regarding her condition or for health maintenance advice. Please see specific information pulled  into the AVS if appropriate.

## 2022-04-20 ENCOUNTER — OFFICE VISIT (OUTPATIENT)
Dept: OTOLARYNGOLOGY | Facility: CLINIC | Age: 42
End: 2022-04-20

## 2022-04-20 VITALS — HEIGHT: 68 IN | WEIGHT: 242 LBS | TEMPERATURE: 98 F | BODY MASS INDEX: 36.68 KG/M2

## 2022-04-20 DIAGNOSIS — E06.3 HYPOTHYROIDISM DUE TO HASHIMOTO'S THYROIDITIS: Primary | ICD-10-CM

## 2022-04-20 DIAGNOSIS — E03.8 HYPOTHYROIDISM DUE TO HASHIMOTO'S THYROIDITIS: Primary | ICD-10-CM

## 2022-04-20 PROCEDURE — 99024 POSTOP FOLLOW-UP VISIT: CPT | Performed by: OTOLARYNGOLOGY

## 2022-04-20 RX ORDER — CIPROFLOXACIN 500 MG/1
500 TABLET, FILM COATED ORAL EVERY 12 HOURS SCHEDULED
Qty: 20 TABLET | Refills: 0 | Status: SHIPPED | OUTPATIENT
Start: 2022-04-20 | End: 2022-04-30

## 2022-04-20 NOTE — PROGRESS NOTES
Patient Name: Arlen Hernandes   Visit Date: 04/20/2022   Patient ID: [id number]  Provider: Indra Mccord MD    Sex: female  [place] [facility]   YOB: 1980  Location Address: [address] [place] [address], [address] [place] [address],?[place]?[id number]    Primary Care Provider Allie Guzman APRN  Location Phone: [phone]    Referring Provider: No ref. provider found        Chief Complaint  Wound Check    History of Present Illness  Arlen Hernandes is a 41 y.o. female who is seen for follow-up of hypothyroidism and new enlarged right thyroid lobe with possible nodule status post right thyroid lobectomy and isthmusectomy on 3/29/2022.  She has been seen by JULIETA Mercedes on 7/14/2021 and 1/14/2022.  Please see those notes for further details.  Thyroid ultrasound on 1/3/2022 revealed a possible 4 cm right thyroid nodule which previously measured 4.5 cm on 3/21/2016.  An FNA at the time revealed an atypical lymphoid population with occasional Hurthle cells likely consistent with Hashimoto's thyroiditis.  She reported discomfort in the area of her right thyroid lobe.    She returns today for follow-up status post right thyroid lobectomy and isthmusectomy on 3/29/2022.  Final pathology was consistent with Hashimoto's thyroiditis.  There was no evidence of cancer or parathyroid tissue.  She was last seen on 4/4/2022 which time she was healing well and her sutures were removed.  She tells me that she remains puffy and uncomfortable in the area of her incision.  She does feel hot but does not believe she has had any fevers.  She has not experienced any drainage from the incision.  She remains quite fatigued.    Past Medical History:   Diagnosis Date   • Elevated cholesterol    • Headache 05-   • Hyperlipidemia    • Thyroid disorder        Past Surgical History:   Procedure Laterality Date   • HYSTERECTOMY  2018   • LYMPHADENECTOMY Right 2012    NECK   • THYROIDECTOMY Right 3/29/2022     "Procedure: RIGHT THYROID LOBECTOMY, ISTHMUSECTOMY;  Surgeon: Indra Mccord MD;  Location: [facility] [place];  Service: ENT;  Laterality: Right;         Current Outpatient Medications:   •  Cyanocobalamin (Vitamin B12) 1000 MCG tablet controlled-release, Take 1 tablet by mouth Daily. INST PER ANESTHESIA PROTOCOL, Disp: , Rfl:   •  levothyroxine (SYNTHROID, LEVOTHROID) 100 MCG tablet, Take 100 mcg by mouth Every Morning., Disp: , Rfl:   •  pravastatin (PRAVACHOL) 40 MG tablet, Take 40 mg by mouth every night at bedtime., Disp: , Rfl:   •  vitamin D3 125 MCG (5000 UT) capsule capsule, Take 1 capsule by mouth Daily. INST PER ANESTHESIA PROTOCOL, Disp: , Rfl:   •  bacitracin 500 UNIT/GM ointment, APPLY TOPICALLY TO THE AFFECTED AREA TWICE DAILY FOR 10 DAYS AS DIRECTED, Disp: , Rfl:   •  ciprofloxacin (CIPRO) 500 MG tablet, Take 1 tablet by mouth Every 12 (Twelve) Hours for 10 days., Disp: 20 tablet, Rfl: 0  •  HYDROcodone-acetaminophen (Norco) 7.5-325 MG per tablet, Take 1 tablet by mouth Every 4 (Four) Hours As Needed for Moderate Pain ., Disp: 20 tablet, Rfl: 0     No Known Allergies    Social History     Tobacco Use   • Smoking status: Current Some Day Smoker     Packs/day: 0.50     Years: 15.00     Pack years: 7.50     Types: Cigarettes   • Smokeless tobacco: Never Used   • Tobacco comment: INST PER ANESTHESIA PROTOCOL   Vaping Use   • Vaping Use: Never used   Substance Use Topics   • Alcohol use: Not Currently     Comment: Rarely    • Drug use: Never        Objective     Vital Signs:   Temp 98 °F (36.7 °C) (Temporal)   Ht 172.7 cm (68\")   Wt 110 kg (242 lb)   BMI 36.80 kg/m²       Physical Exam    General: Well developed, well nourished patient of stated age in no acute distress. Voice is strong and clear.   Head: Normocephalic and atraumatic.  Face: No lesions.  Bilateral parotid and submandibular glands are unremarkable.  Stensen's and Warthin's ducts are productive of clear saliva bilaterally.  " House-Brackmann I/VI     bilaterally.   muscles and temporomandibular joint nontender to palpation.  No TMJ crepitus.  Eyes: PERRLA, sclerae anicteric, no conjunctival injection. Extra ocular movements are intact and full. No nystagmus.   Ears: Auricles are normal in appearance. Bilateral external auditory canals are unremarkable. Bilateral tympanic membranes are clear and without effusion. Hearing normal to conversational voice.   Nose: External nose is normal in appearance. Bilateral nares are patent with normal appearing mucosa. Septum midline. Turbinates are unremarkable. No lesions.   Oral Cavity: Lips are normal in appearance. Oral mucosa is unremarkable. Gingiva is unremarkable. Normal dentition for age. Tongue is unremarkable with good movement. Hard palate is unremarkable.   Oropharynx: Soft palate is unremarkable with full movement. Uvula is unremarkable. Bilateral tonsils are unremarkable. Posterior oropharynx is unremarkable.    Larynx and hypopharynx: Deferred secondary to gag reflex.   Neck: Supple.  No mass.  Nontender to palpation.  Trachea midline.  Thyroid lobectomy incision is well-healed but the tissue surrounding that area remains more raised than typical although soft and nontender to palpation.  After a thorough discussion this area was anesthetized with 2 mL of 1% lidocaine with 1 100,000 epinephrine.  After giving the medication ample time to take effect an 18-gauge syringe was inserted into both the right lateral and middle aspect of the well-healed incision but nothing was able to be aspirated.   Lymphatic: No lymphadenopathy upon palpation.   Psychiatric: Appropriate affect, cooperative   Neurologic: Oriented x 3, strength symmetric in all extremities, Cranial Nerves II-XII are grossly intact to confrontation   Skin: Warm and dry. No rashes.    Procedures           Result Review :               Assessment and Plan    Diagnoses and all orders for this visit:    1. Hypothyroidism  due to Hashimoto's thyroiditis (Primary)  -     US Thyroid; Future  -     ciprofloxacin (CIPRO) 500 MG tablet; Take 1 tablet by mouth Every 12 (Twelve) Hours for 10 days.  Dispense: 20 tablet; Refill: 0  -     TSH; Future    Impressions and findings were discussed at great length.  Currently, she is seen with continued fullness in the area of her thyroid lobectomy.  This is bilateral in the midline just in the area of her incision.  There is no obvious fluctuance on examination today but she does report that it still is uncomfortable and she has been feeling hot recently.  We discussed the possibility that there may be a small hematoma under this area even though it is not obviously palpable.  Options for further evaluation management were discussed and she elected to pursue a trial of aspiration which was performed today in clinic.  No fluid was aspirated from the area and she will be placed on ciprofloxacin in case there is any deep or infected fluid collection.  We will also arrange for an ultrasound and sooner TSH testing.  She was given ample time to ask questions, all of which were answered to the best my ability.      Follow Up   No follow-ups on file.  Patient was given instructions and counseling regarding her condition or for health maintenance advice. Please see specific information pulled into the AVS if appropriate.

## 2022-05-11 ENCOUNTER — HOSPITAL ENCOUNTER (OUTPATIENT)
Dept: ULTRASOUND IMAGING | Facility: HOSPITAL | Age: 42
Discharge: HOME | End: 2022-05-11
Admitting: OTOLARYNGOLOGY

## 2022-05-11 DIAGNOSIS — E03.8 HYPOTHYROIDISM DUE TO HASHIMOTO'S THYROIDITIS: ICD-10-CM

## 2022-05-11 DIAGNOSIS — E06.3 HYPOTHYROIDISM DUE TO HASHIMOTO'S THYROIDITIS: ICD-10-CM

## 2022-05-11 PROCEDURE — 76536 US EXAM OF HEAD AND NECK: CPT

## 2022-05-20 ENCOUNTER — OFFICE VISIT (OUTPATIENT)
Dept: OTOLARYNGOLOGY | Facility: CLINIC | Age: 42
End: 2022-05-20

## 2022-05-20 VITALS — BODY MASS INDEX: 36.4 KG/M2 | WEIGHT: 240.2 LBS | TEMPERATURE: 97.8 F | HEIGHT: 68 IN

## 2022-05-20 DIAGNOSIS — E04.9 ENLARGED THYROID GLAND: Primary | ICD-10-CM

## 2022-05-20 DIAGNOSIS — E03.8 HYPOTHYROIDISM DUE TO HASHIMOTO'S THYROIDITIS: ICD-10-CM

## 2022-05-20 DIAGNOSIS — E04.1 THYROID NODULE: ICD-10-CM

## 2022-05-20 DIAGNOSIS — E06.3 HYPOTHYROIDISM DUE TO HASHIMOTO'S THYROIDITIS: ICD-10-CM

## 2022-05-20 PROCEDURE — 99024 POSTOP FOLLOW-UP VISIT: CPT | Performed by: OTOLARYNGOLOGY

## 2022-05-20 RX ORDER — LEVOTHYROXINE SODIUM 0.12 MG/1
125 TABLET ORAL
Qty: 30 TABLET | Refills: 3 | Status: SHIPPED | COMMUNITY
Start: 2022-05-20

## 2022-05-20 NOTE — PROGRESS NOTES
Patient Name: Arlen Hernandes   Visit Date: 05/20/2022   Patient ID: [id number]  Provider: Indra Mccord MD    Sex: female  Location: [facility]   YOB: 1980  Location Address: [address] [place] [address], [address] [place] [address],?[place]?[address]    Primary Care Provider Allie Guzman APRN  Location Phone: [phone]    Referring Provider: No ref. provider found        Chief Complaint  4 week follow up w/ lab and US results    ww    Wound Check      Arlen Hernandes is a 41 y.o. female who is seen for follow-up of hypothyroidism and new enlarged right thyroid lobe with possible nodule status post right thyroid lobectomy and isthmusectomy on 3/29/2022.  She has been seen by JULIETA Mercedes on 7/14/2021 and 1/14/2022.  Please see those notes for further details.  Thyroid ultrasound on 1/3/2022 revealed a possible 4 cm right thyroid nodule which previously measured 4.5 cm on 3/21/2016.  An FNA at the time revealed an atypical lymphoid population with occasional Hurthle cells likely consistent with Hashimoto's thyroiditis.  She reported discomfort in the area of her right thyroid lobe.    She returns today for follow-up status post right thyroid lobectomy and isthmusectomy on 3/29/2022.  Final pathology was consistent with Hashimoto's thyroiditis.  There was no evidence of cancer or parathyroid tissue.  She was last seen on 4/20/2022 which time she reported puffiness and tenderness in the area of her surgery.  She also mentioned fatigue.  An aspiration was attempted but there was no fluid aspirated.  She was placed on a course of  ciprofloxacin and an ultrasound ordered.  Thyroid ultrasound on 5/11/2022 did not reveal any fluid collection.  Most recent laboratory evaluation on 4/21/2022 revealed a normal white blood cell count of 8.1, elevated TSH of 6.14, a normal total T4 of 10.6, and normal T3 uptake of 28%, a normal free T4 index of 3, and a normal calcium of 9.5.  She tells me it still  "feels puffy but less tender.      Past Medical History:   Diagnosis Date   • Elevated cholesterol    • Headache 05-   • Hyperlipidemia    • Thyroid disorder        Past Surgical History:   Procedure Laterality Date   • HYSTERECTOMY  2018   • LYMPHADENECTOMY Right 2012    NECK   • THYROIDECTOMY Right 3/29/2022    Procedure: RIGHT THYROID LOBECTOMY, ISTHMUSECTOMY;  Surgeon: Indra Mccord MD;  Location: [facility] [place];  Service: ENT;  Laterality: Right;         Current Outpatient Medications:   •  Cyanocobalamin (Vitamin B12) 1000 MCG tablet controlled-release, Take 1 tablet by mouth Daily. INST PER ANESTHESIA PROTOCOL, Disp: , Rfl:   •  levothyroxine (SYNTHROID, LEVOTHROID) 125 MCG tablet, Take 1 tablet by mouth Every Morning., Disp: 30 tablet, Rfl: 3  •  pravastatin (PRAVACHOL) 40 MG tablet, Take 40 mg by mouth every night at bedtime., Disp: , Rfl:   •  vitamin D3 125 MCG (5000 UT) capsule capsule, Take 1 capsule by mouth Daily. INST PER ANESTHESIA PROTOCOL, Disp: , Rfl:   •  HYDROcodone-acetaminophen (Norco) 7.5-325 MG per tablet, Take 1 tablet by mouth Every 4 (Four) Hours As Needed for Moderate Pain ., Disp: 20 tablet, Rfl: 0     No Known Allergies    Social History     Tobacco Use   • Smoking status: Current Some Day Smoker     Packs/day: 0.50     Years: 15.00     Pack years: 7.50     Types: Cigarettes   • Smokeless tobacco: Never Used   • Tobacco comment: INST PER ANESTHESIA PROTOCOL   Vaping Use   • Vaping Use: Never used   Substance Use Topics   • Alcohol use: Not Currently     Comment: Rarely    • Drug use: Never        Objective     Vital Signs:   Temp 97.8 °F (36.6 °C) (Temporal)   Ht 172.7 cm (68\")   Wt 109 kg (240 lb 3.2 oz)   BMI 36.52 kg/m²       Physical Exam    General: Well developed, well nourished patient of stated age in no acute distress. Voice is strong and clear.   Head: Normocephalic and atraumatic.  Face: No lesions.  Bilateral parotid and submandibular glands are " unremarkable.  Stensen's and Warthin's ducts are productive of clear saliva bilaterally.  House-Brackmann I/VI     bilaterally.   muscles and temporomandibular joint nontender to palpation.  No TMJ crepitus.  Eyes: PERRLA, sclerae anicteric, no conjunctival injection. Extra ocular movements are intact and full. No nystagmus.   Ears: Auricles are normal in appearance. Bilateral external auditory canals are unremarkable. Bilateral tympanic membranes are clear and without effusion. Hearing normal to conversational voice.   Nose: External nose is normal in appearance. Bilateral nares are patent with normal appearing mucosa. Septum midline. Turbinates are unremarkable. No lesions.   Oral Cavity: Lips are normal in appearance. Oral mucosa is unremarkable. Gingiva is unremarkable. Normal dentition for age. Tongue is unremarkable with good movement. Hard palate is unremarkable.   Oropharynx: Soft palate is unremarkable with full movement. Uvula is unremarkable. Bilateral tonsils are unremarkable. Posterior oropharynx is unremarkable.    Larynx and hypopharynx: Deferred secondary to gag reflex.   Neck: Supple.  No mass.  Nontender to palpation.  Trachea midline.  Thyroid lobectomy incision is well-healed with palpable subcutaneous scar tissue which is softer than previously.   Lymphatic: No lymphadenopathy upon palpation.   Psychiatric: Appropriate affect, cooperative   Neurologic: Oriented x 3, strength symmetric in all extremities, Cranial Nerves II-XII are grossly intact to confrontation   Skin: Warm and dry. No rashes.    Procedures           Result Review :               Assessment and Plan    Diagnoses and all orders for this visit:    1. Enlarged thyroid gland (Primary)    2. Thyroid nodule    3. Hypothyroidism due to Hashimoto's thyroiditis    Impressions and findings were discussed at great length.  Currently, she feels as though the area is less prominent and tender than previously.  We reviewed and  discussed the results of her ultrasound and thyroid function testing which revealed an elevated TSH and no evidence of a fluid collection.  We discussed that this is consistent with scar tissue which may make her more quickly with frequent massage.  In regards to her increased TSH we will increase her levothyroxine to 125 mcg daily.  She will follow-up with me in 3 months or sooner if needed.    Follow Up   Return in about 3 months (around 8/20/2022).  Patient was given instructions and counseling regarding her condition or for health maintenance advice. Please see specific information pulled into the AVS if appropriate.

## 2022-06-20 ENCOUNTER — TRANSCRIBE ORDERS (OUTPATIENT)
Dept: ADMINISTRATIVE | Facility: HOSPITAL | Age: 42
End: 2022-06-20

## 2022-06-20 DIAGNOSIS — Z12.31 ENCOUNTER FOR SCREENING MAMMOGRAM FOR MALIGNANT NEOPLASM OF BREAST: Primary | ICD-10-CM

## 2022-08-18 ENCOUNTER — HOSPITAL ENCOUNTER (OUTPATIENT)
Dept: MAMMOGRAPHY | Facility: HOSPITAL | Age: 42
Discharge: HOME | End: 2022-08-18
Admitting: NURSE PRACTITIONER

## 2022-08-18 DIAGNOSIS — Z12.31 ENCOUNTER FOR SCREENING MAMMOGRAM FOR MALIGNANT NEOPLASM OF BREAST: ICD-10-CM

## 2022-08-18 PROCEDURE — 77067 SCR MAMMO BI INCL CAD: CPT

## 2022-08-18 PROCEDURE — 77063 BREAST TOMOSYNTHESIS BI: CPT

## 2022-09-02 ENCOUNTER — TRANSCRIBE ORDERS (OUTPATIENT)
Dept: ADMINISTRATIVE | Facility: HOSPITAL | Age: 42
End: 2022-09-02

## 2022-09-02 DIAGNOSIS — R92.8 ABNORMAL MAMMOGRAM: Primary | ICD-10-CM

## 2022-09-08 ENCOUNTER — HOSPITAL ENCOUNTER (OUTPATIENT)
Dept: ULTRASOUND IMAGING | Facility: HOSPITAL | Age: 42
Discharge: HOME | End: 2022-09-08
Admitting: NURSE PRACTITIONER

## 2022-09-08 ENCOUNTER — HOSPITAL ENCOUNTER (OUTPATIENT)
Dept: MAMMOGRAPHY | Facility: HOSPITAL | Age: 42
Discharge: HOME | End: 2022-09-08
Admitting: NURSE PRACTITIONER

## 2022-09-08 ENCOUNTER — HOSPITAL ENCOUNTER (OUTPATIENT)
Dept: MAMMOGRAPHY | Facility: HOSPITAL | Age: 42
End: 2022-09-08

## 2022-09-08 ENCOUNTER — HOSPITAL ENCOUNTER (OUTPATIENT)
Dept: ULTRASOUND IMAGING | Facility: HOSPITAL | Age: 42
End: 2022-09-08

## 2022-09-08 DIAGNOSIS — R92.8 ABNORMAL MAMMOGRAM: ICD-10-CM

## 2022-09-08 PROCEDURE — 77065 DX MAMMO INCL CAD UNI: CPT

## 2022-09-08 PROCEDURE — G0279 TOMOSYNTHESIS, MAMMO: HCPCS

## 2022-09-08 PROCEDURE — 76642 ULTRASOUND BREAST LIMITED: CPT

## 2023-02-08 ENCOUNTER — TRANSCRIBE ORDERS (OUTPATIENT)
Dept: ADMINISTRATIVE | Facility: HOSPITAL | Age: 43
End: 2023-02-08
Payer: COMMERCIAL

## 2023-02-08 DIAGNOSIS — R92.8 ABNORMAL MAMMOGRAM: Primary | ICD-10-CM

## 2023-02-21 ENCOUNTER — HOSPITAL ENCOUNTER (OUTPATIENT)
Dept: ULTRASOUND IMAGING | Facility: HOSPITAL | Age: 43
Discharge: HOME | End: 2023-02-21
Payer: COMMERCIAL

## 2023-02-21 ENCOUNTER — HOSPITAL ENCOUNTER (OUTPATIENT)
Dept: MAMMOGRAPHY | Facility: HOSPITAL | Age: 43
Discharge: HOME | End: 2023-02-21
Payer: COMMERCIAL

## 2023-02-21 DIAGNOSIS — R92.8 ABNORMAL MAMMOGRAM: ICD-10-CM

## 2023-02-21 PROCEDURE — 77065 DX MAMMO INCL CAD UNI: CPT

## 2023-02-21 PROCEDURE — 76642 ULTRASOUND BREAST LIMITED: CPT

## 2023-02-21 PROCEDURE — G0279 TOMOSYNTHESIS, MAMMO: HCPCS

## 2023-08-02 RX ORDER — LEVOTHYROXINE SODIUM 0.12 MG/1
TABLET ORAL
Qty: 30 TABLET | Refills: 3 | Status: SHIPPED | OUTPATIENT
Start: 2023-08-02

## 2023-09-26 ENCOUNTER — TRANSCRIBE ORDERS (OUTPATIENT)
Dept: ADMINISTRATIVE | Facility: HOSPITAL | Age: 43
End: 2023-09-26
Payer: COMMERCIAL

## 2023-09-26 DIAGNOSIS — N63.21 MASS OF UPPER OUTER QUADRANT OF LEFT BREAST: Primary | ICD-10-CM

## 2023-09-26 DIAGNOSIS — Z12.31 VISIT FOR SCREENING MAMMOGRAM: Primary | ICD-10-CM

## 2023-10-11 ENCOUNTER — HOSPITAL ENCOUNTER (OUTPATIENT)
Dept: ULTRASOUND IMAGING | Facility: HOSPITAL | Age: 43
Discharge: HOME | End: 2023-10-11
Payer: COMMERCIAL

## 2023-10-11 ENCOUNTER — HOSPITAL ENCOUNTER (OUTPATIENT)
Dept: MAMMOGRAPHY | Facility: HOSPITAL | Age: 43
Discharge: HOME | End: 2023-10-11
Payer: COMMERCIAL

## 2023-10-11 DIAGNOSIS — N63.21 MASS OF UPPER OUTER QUADRANT OF LEFT BREAST: ICD-10-CM

## 2023-10-11 PROCEDURE — 77066 DX MAMMO INCL CAD BI: CPT

## 2023-10-11 PROCEDURE — G0279 TOMOSYNTHESIS, MAMMO: HCPCS

## 2023-11-02 RX ORDER — LEVOTHYROXINE SODIUM 0.12 MG/1
TABLET ORAL
Qty: 90 TABLET | Refills: 2 | Status: SHIPPED | OUTPATIENT
Start: 2023-11-02

## 2025-01-17 ENCOUNTER — TRANSCRIBE ORDERS (OUTPATIENT)
Dept: ADMINISTRATIVE | Facility: HOSPITAL | Age: 45
End: 2025-01-17
Payer: COMMERCIAL

## 2025-01-17 DIAGNOSIS — Z12.31 VISIT FOR SCREENING MAMMOGRAM: Primary | ICD-10-CM

## 2025-08-05 ENCOUNTER — TRANSCRIBE ORDERS (OUTPATIENT)
Dept: ADMINISTRATIVE | Facility: HOSPITAL | Age: 45
End: 2025-08-05
Payer: COMMERCIAL

## 2025-08-05 DIAGNOSIS — Z12.31 SCREENING MAMMOGRAM, ENCOUNTER FOR: Primary | ICD-10-CM

## 2025-08-05 DIAGNOSIS — R59.0 LYMPHADENOPATHY OF RIGHT CERVICAL REGION: Primary | ICD-10-CM

## (undated) DEVICE — VAGINAL PREP TRAY: Brand: MEDLINE INDUSTRIES, INC.

## (undated) DEVICE — ANTIBACTERIAL UNDYED BRAIDED (POLYGLACTIN 910), SYNTHETIC ABSORBABLE SUTURE: Brand: COATED VICRYL

## (undated) DEVICE — ELECTRD BLD EDGE/INSUL1P 2.4X5.1MM STRL

## (undated) DEVICE — INTENDED FOR TISSUE SEPARATION, AND OTHER PROCEDURES THAT REQUIRE A SHARP SURGICAL BLADE TO PUNCTURE OR CUT.: Brand: BARD-PARKER ® CARBON RIB-BACK BLADES

## (undated) DEVICE — EMG TUBE 8229707 NIM TRIVANTAGE 7.0MM ID: Brand: NIM TRIVANTAGE™

## (undated) DEVICE — SUT ETHLN 3-0 FS118IN 663H

## (undated) DEVICE — ENT-LF: Brand: MEDLINE INDUSTRIES, INC.

## (undated) DEVICE — Device

## (undated) DEVICE — SUT PROLN 6/0 P1 18IN 8697G

## (undated) DEVICE — STERILE POLYISOPRENE POWDER-FREE SURGICAL GLOVES: Brand: PROTEXIS

## (undated) DEVICE — DEV OPN LIGASURE DISSCT EXACT 40DEG 21.6MM BX/1

## (undated) DEVICE — SLV SCD KN/LEN ADJ EXPRSS BLENDED MD 1P/U

## (undated) DEVICE — PROBE 8225825 3PK INCREMT STD PRASS ROHS

## (undated) DEVICE — PROXIMATE RH ROTATING HEAD SKIN STAPLERS (35 WIDE) CONTAINS 35 STAINLESS STEEL STAPLES: Brand: PROXIMATE